# Patient Record
Sex: MALE | Race: WHITE
[De-identification: names, ages, dates, MRNs, and addresses within clinical notes are randomized per-mention and may not be internally consistent; named-entity substitution may affect disease eponyms.]

---

## 2018-12-17 ENCOUNTER — HOSPITAL ENCOUNTER (OUTPATIENT)
Dept: HOSPITAL 89 - RAD | Age: 18
End: 2018-12-17
Attending: FAMILY MEDICINE
Payer: COMMERCIAL

## 2018-12-17 DIAGNOSIS — J40: Primary | ICD-10-CM

## 2018-12-17 PROCEDURE — 71046 X-RAY EXAM CHEST 2 VIEWS: CPT

## 2018-12-17 NOTE — RADIOLOGY IMAGING REPORT
FACILITY: [facility] 

 

PATIENT NAME: Joseph Huynh

: 2000

MR: [id number]

V: [id number]

EXAM DATE: [id number]

ORDERING PHYSICIAN: MARCUS RAMIRES

TECHNOLOGIST: 

 

Location: [facility] [place]

Patient: Joseph Huynh

: 2000

MRN: [id number]

Visit/Account:[id number]

Date of Sevice: 2018

 

ACCESSION #: [id number]

 

Exam type: CHEST PA AND LAT

 

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

 

Comparison: 2009.

 

Findings:

 

Again noted are median sternotomy sutures, mediastinal clips and vascular coils.  A globular configur
ation the heart is again seen.  There is no evidence of focal infiltrates pleural effusions or overt 
pulmonary edema.

 

IMPRESSION:

 

1.  Postsurgical changes of the heart is described

 

No acute cardiopulmonary process is seen

 

Report Dictated By: Ember Hernandez MD at 2018 4:07 PM

 

Report E-Signed By: Ember Hernandez MD  at 2018 4:09 PM

 

WSN:[id number]